# Patient Record
Sex: FEMALE | Race: WHITE | HISPANIC OR LATINO | Employment: UNEMPLOYED | URBAN - METROPOLITAN AREA
[De-identification: names, ages, dates, MRNs, and addresses within clinical notes are randomized per-mention and may not be internally consistent; named-entity substitution may affect disease eponyms.]

---

## 2017-02-16 ENCOUNTER — HOSPITAL ENCOUNTER (OUTPATIENT)
Dept: RADIOLOGY | Facility: HOSPITAL | Age: 52
Discharge: HOME | End: 2017-02-16
Attending: OBSTETRICS & GYNECOLOGY
Payer: MEDICAID

## 2017-02-16 ENCOUNTER — OFFICE VISIT (OUTPATIENT)
Dept: OBSTETRICS AND GYNECOLOGY | Facility: CLINIC | Age: 52
End: 2017-02-16
Payer: MEDICAID

## 2017-02-16 VITALS — SYSTOLIC BLOOD PRESSURE: 128 MMHG | DIASTOLIC BLOOD PRESSURE: 74 MMHG | WEIGHT: 175.94 LBS

## 2017-02-16 DIAGNOSIS — N95.2 POSTMENOPAUSAL ATROPHIC VAGINITIS: ICD-10-CM

## 2017-02-16 DIAGNOSIS — Z12.31 ENCOUNTER FOR SCREENING MAMMOGRAM FOR MALIGNANT NEOPLASM OF BREAST: ICD-10-CM

## 2017-02-16 DIAGNOSIS — Z12.31 ENCOUNTER FOR SCREENING MAMMOGRAM FOR MALIGNANT NEOPLASM OF BREAST: Primary | ICD-10-CM

## 2017-02-16 DIAGNOSIS — E89.40 SURGICAL MENOPAUSE: ICD-10-CM

## 2017-02-16 PROCEDURE — 88141 CYTOPATH C/V INTERPRET: CPT | Mod: ,,, | Performed by: PATHOLOGY

## 2017-02-16 PROCEDURE — 99999 PR PBB SHADOW E&M-NEW PATIENT-LVL III: CPT | Mod: PBBFAC,,, | Performed by: OBSTETRICS & GYNECOLOGY

## 2017-02-16 PROCEDURE — 77067 SCR MAMMO BI INCL CAD: CPT | Mod: 26,,, | Performed by: RADIOLOGY

## 2017-02-16 PROCEDURE — 88175 CYTOPATH C/V AUTO FLUID REDO: CPT | Performed by: PATHOLOGY

## 2017-02-16 PROCEDURE — 99204 OFFICE O/P NEW MOD 45 MIN: CPT | Mod: S$PBB,,, | Performed by: OBSTETRICS & GYNECOLOGY

## 2017-02-16 PROCEDURE — 77067 SCR MAMMO BI INCL CAD: CPT | Mod: TC

## 2017-02-16 PROCEDURE — 99203 OFFICE O/P NEW LOW 30 MIN: CPT | Mod: PBBFAC,PO | Performed by: OBSTETRICS & GYNECOLOGY

## 2017-02-16 PROCEDURE — 77063 BREAST TOMOSYNTHESIS BI: CPT | Mod: 26,,, | Performed by: RADIOLOGY

## 2017-02-16 RX ORDER — LOVASTATIN 20 MG/1
TABLET ORAL
COMMUNITY
Start: 2017-02-07

## 2017-02-16 RX ORDER — INSULIN GLARGINE 100 [IU]/ML
INJECTION, SOLUTION SUBCUTANEOUS
COMMUNITY
Start: 2017-02-07

## 2017-02-16 RX ORDER — ESTRADIOL 0.1 MG/G
1 CREAM VAGINAL
Qty: 42.5 G | Refills: 4 | Status: SHIPPED | OUTPATIENT
Start: 2017-02-16 | End: 2018-02-16

## 2017-02-16 RX ORDER — BENAZEPRIL HYDROCHLORIDE 20 MG/1
TABLET ORAL
COMMUNITY
Start: 2017-02-07

## 2017-02-16 RX ORDER — CITALOPRAM 20 MG/1
TABLET, FILM COATED ORAL
COMMUNITY
Start: 2017-02-07

## 2017-02-16 RX ORDER — METFORMIN HYDROCHLORIDE 1000 MG/1
TABLET ORAL
COMMUNITY
Start: 2017-02-07

## 2017-02-17 NOTE — PROGRESS NOTES
GYNECOLOGY  MC:  Meche Lacy is a 51 y.o.    Here today for   Annual visit   Hx of Hysterectomy 20 years ago Due to Cervical cancer; followed up x  5 years without evidence of recurrence, now in yearly follow up   No gyn symptoms   Used to take premarin x menopausal symptoms but not anymore     Past Medical History   Diagnosis Date    Diabetes mellitus      Past Surgical History   Procedure Laterality Date    Hysterectomy       section      Cholecystectomy       Family History   Problem Relation Age of Onset    Cancer Maternal Grandfather      Social History   Substance Use Topics    Smoking status: Never Smoker    Smokeless tobacco: None    Alcohol use No     OB History    Para Term  AB SAB TAB Ectopic Multiple Living   5 2 2  3     2      # Outcome Date GA Lbr Pramod/2nd Weight Sex Delivery Anes PTL Lv   5 Term 95    M CS-LTranv   Y   4 Term 87    M Vag-Spont   Y   3 AB            2 AB            1 AB                   Visit Vitals    /74    Wt 79.8 kg (175 lb 14.8 oz)       Last PAP= 1 year ago  LMP = n/a  Last Mammogram = 2 years ago  Last Colonoscopy  =n/a      COMPREHENSIVE GYN HISTORY:  G 5 P2      PAP History: Denies abnormal Paps.  Infection History: Denies STDs. Denies PID.  Benign History: Denies uterine fibroids. Denies ovarian cysts. Denies endometriosis.   Cancer History: Per HPI   Sexual Activity History: (  ) Yes   (  )   no   Menstrual History: Age of menarche: (   )  years. Every (   )       days, flows for (   )   days. moderate  flow.  Dysmenorrhea History:  absent  Contraception:  n/a    ROS  GENERAL: Denies significant weight gain or weight loss. Feeling well overall.  SKIN: Denies rash or lesions.  Normal skin turgor  HEAD: Denies head injury or headache.   NODES: Denies enlarged lymph nodes.   CHEST: Denies chest pain or shortness of breath.   CARDIOVASCULAR: Denies palpitations or left sided chest pain.   ABDOMEN: No abdominal  pain,no  diarrhea,constipation  nausea, vomiting or rectal bleeding.   URINARY: normal  Frequency,no  Dysuria or burning on urination.   REPRODUCTIVE: Per HPI   BREASTS: The patient sometimes performs breast self-examination and denies pain, lumps, or nipple discharge.   HEMATOLOGIC: No easy bruisability or excessive bleeding.   MUSCULOSKELETAL: Denies joint pain or swelling.   NEUROLOGIC: Denies syncope or weakness.   PSYCHIATRIC: Denies depression, anxiety or mood swings.    Physical Exam     Constitutional: She is oriented to person, place, and time. She appears well-developed and well-nourished. No distress.   HENT:   Head: Normocephalic.   NECK: Neck symmetric without masses or thyromegaly.  Cardiovascular: Normal rate and regular rhythm.   Pulmonary/Chest: Effort normal and breath sounds normal. No respiratory distress. She has no wheezes.   Breasts: Symmetrical, no skin changes or visible lesions. No palpable masses, nipple discharge or adenopathy bilaterally.  Abdominal: Soft not distended. Bowel sounds are normal. She exhibits   no masses . No tenderness to palpation.   Genitourinary: Pelvic exam was performed with patient supine.   External genitalia normal no lesions.Normal hair distribution   Adequate perineal body,normal urethral meatus   Vagina pale, thin with signs of atrophy , no vaginal  Discharge.   Cervix absent    Adnexa without masses or tenderness. Urethra and bladder normal  Extremities normal no cyanosis ,edema.     Procedures:  Mammogram  PAP of vaginal cuff       A/P Meche Lacy 51 y.o.     Dx=  1- ROutine gyn exam  2- Hx hysterectomy   3- Hx cervical cancer   4-Postmenopausal vaginal atrophy     Plan:  Routine gyn   -s/p normal breast exam   -s/p normal pelvic exam:  PAtienr desires to start using estrogen cream for vaginal dryness and atrophy   Rx for estrace 1 gram bi-weekly   Will follow PAP and mammogram    Patient was counseled today on A.C.S. Pap guidelines and  recommendations for yearly pelvic exams, mammograms and monthly self breast exams; to see her PCP for other health maintenance, nutrition and weight gain counseling, discussed lab values.  Discussed colonoscopy recommendations every 10 years starting at age 50   Calcium and vit D daily intake     F/u in 1 yr or EMI Gunter M.D.   OB/GYN

## 2017-02-22 ENCOUNTER — TELEPHONE (OUTPATIENT)
Dept: OBSTETRICS AND GYNECOLOGY | Facility: CLINIC | Age: 52
End: 2017-02-22

## 2017-02-23 NOTE — TELEPHONE ENCOUNTER
----- Message from Yani Calvo sent at 2/23/2017  2:21 PM CST -----  Contact: self, [phone]  Patient called in returning your call. Please advise.

## 2017-02-23 NOTE — TELEPHONE ENCOUNTER
Called and informed patient of results (pap unsatisfactory) appointment made. Patient agreed and verbalized understanding.

## 2017-02-23 NOTE — TELEPHONE ENCOUNTER
PAP unsatisfactory  Need Re-PAP in 4 weeks  When is not bleeding      Joseph Gunter M.D.   OB/GYN    2/22/2017

## 2017-03-21 ENCOUNTER — OFFICE VISIT (OUTPATIENT)
Dept: OBSTETRICS AND GYNECOLOGY | Facility: CLINIC | Age: 52
End: 2017-03-21
Payer: MEDICAID

## 2017-03-21 VITALS — WEIGHT: 177.69 LBS | SYSTOLIC BLOOD PRESSURE: 112 MMHG | DIASTOLIC BLOOD PRESSURE: 74 MMHG

## 2017-03-21 DIAGNOSIS — R87.615 ENCOUNTER FOR REPEAT PAP SMEAR DUE TO PREVIOUS INSUFFICIENT CERVICAL CELLS: ICD-10-CM

## 2017-03-21 DIAGNOSIS — N95.2 POSTMENOPAUSAL ATROPHIC VAGINITIS: ICD-10-CM

## 2017-03-21 DIAGNOSIS — N76.0 VULVOVAGINITIS: Primary | ICD-10-CM

## 2017-03-21 PROCEDURE — 99999 PR PBB SHADOW E&M-EST. PATIENT-LVL III: CPT | Mod: PBBFAC,,, | Performed by: OBSTETRICS & GYNECOLOGY

## 2017-03-21 PROCEDURE — 99213 OFFICE O/P EST LOW 20 MIN: CPT | Mod: PBBFAC,PO | Performed by: OBSTETRICS & GYNECOLOGY

## 2017-03-21 PROCEDURE — 99213 OFFICE O/P EST LOW 20 MIN: CPT | Mod: S$PBB,,, | Performed by: OBSTETRICS & GYNECOLOGY

## 2017-03-21 PROCEDURE — 88141 CYTOPATH C/V INTERPRET: CPT | Mod: ,,, | Performed by: PATHOLOGY

## 2017-03-21 PROCEDURE — 88142 CYTOPATH C/V THIN LAYER: CPT | Performed by: PATHOLOGY

## 2017-03-21 RX ORDER — CLOTRIMAZOLE AND BETAMETHASONE DIPROPIONATE 10; .64 MG/G; MG/G
CREAM TOPICAL 2 TIMES DAILY
Qty: 15 G | Refills: 1 | Status: SHIPPED | OUTPATIENT
Start: 2017-03-21

## 2017-03-21 NOTE — PROGRESS NOTES
GYNECOLOGY  MC:  Meche Lacy is a 51 y.o.    Here today re-pap for insufficient  Sample       Hx of Hysterectomy 20 years ago Due to Cervical cancer; followed up x  5 years without evidence of recurrence, now in yearly follow up   No gyn symptoms   Used to take premarin x menopausal symptoms but not anymore     Past Medical History:   Diagnosis Date    Diabetes mellitus      Past Surgical History:   Procedure Laterality Date     SECTION      CHOLECYSTECTOMY      HYSTERECTOMY       Family History   Problem Relation Age of Onset    Cancer Maternal Grandfather      Social History   Substance Use Topics    Smoking status: Never Smoker    Smokeless tobacco: None    Alcohol use No     OB History    Para Term  AB SAB TAB Ectopic Multiple Living   5 2 2  3     2      # Outcome Date GA Lbr Pramod/2nd Weight Sex Delivery Anes PTL Lv   5 Term 95    M CS-LTranv   Y   4 Term 87    M Vag-Spont   Y   3 AB            2 AB            1 AB                   /74  Wt 80.6 kg (177 lb 11.1 oz)    Last PAP= 1 year ago  LMP = n/a  Last Mammogram = 2 years ago  Last Colonoscopy  =n/a      COMPREHENSIVE GYN HISTORY:  G 5 P2      PAP History: Denies abnormal Paps.  Infection History: Denies STDs. Denies PID.  Benign History: Denies uterine fibroids. Denies ovarian cysts. Denies endometriosis.   Cancer History: Per HPI   Sexual Activity History: (  ) Yes   (  )   no   Menstrual History: Age of menarche: (   )  years. Every (   )       days, flows for (   )   days. moderate  flow.  Dysmenorrhea History:  absent  Contraception:  n/a    ROS  GENERAL: Denies significant weight gain or weight loss. Feeling well overall.  SKIN: Denies rash or lesions.  Normal skin turgor  HEAD: Denies head injury or headache.   NODES: Denies enlarged lymph nodes.   CHEST: Denies chest pain or shortness of breath.   CARDIOVASCULAR: Denies palpitations or left sided chest pain.   ABDOMEN: No abdominal pain,no   diarrhea,constipation  nausea, vomiting or rectal bleeding.   URINARY: normal  Frequency,no  Dysuria or burning on urination.   REPRODUCTIVE: Per HPI   BREASTS: The patient sometimes performs breast self-examination and denies pain, lumps, or nipple discharge.   HEMATOLOGIC: No easy bruisability or excessive bleeding.   MUSCULOSKELETAL: Denies joint pain or swelling.   NEUROLOGIC: Denies syncope or weakness.   PSYCHIATRIC: Denies depression, anxiety or mood swings.    Physical Exam     Constitutional: She is oriented to person, place, and time. She appears well-developed and well-nourished. No distress.   HENT:   Head: Normocephalic.   NECK: Neck symmetric without masses or thyromegaly.  Cardiovascular: Normal rate and regular rhythm.   Pulmonary/Chest: Effort normal and breath sounds normal. No respiratory distress. She has no wheezes.   Breasts: Symmetrical, no skin changes or visible lesions. No palpable masses, nipple discharge or adenopathy bilaterally.  Abdominal: Soft not distended. Bowel sounds are normal. She exhibits   no masses . No tenderness to palpation.   Genitourinary: Pelvic exam was performed with patient supine.   External genitalia normal no lesions.Normal hair distribution   Adequate perineal body,normal urethral meatus   Vagina pale, thin with signs of atrophy , no vaginal  Discharge.   Cervix absent    Adnexa without masses or tenderness. Urethra and bladder normal  Extremities normal no cyanosis ,edema.     Procedures:    PAP of vaginal cuff       A/P Meche Roberto Lacy 51 y.o.     Dx=  1- ROutine gyn exam  2- Hx hysterectomy   3- Hx cervical cancer   4-Postmenopausal vaginal atrophy     Plan:  Routine gyn   -s/p normal breast exam   -s/p normal pelvic exam:  PAtienr desires to start using estrogen cream for vaginal dryness and atrophy   Rx for estrace 1 gram bi-weekly   Will follow PAP and mammogram  Mycolog to apply BID  And stop using Vinegar vaginal douches     Patient was counseled  today on A.C.S. Pap guidelines and recommendations for yearly pelvic exams, mammograms and monthly self breast exams; to see her PCP for other health maintenance, nutrition and weight gain counseling, discussed lab values.  Discussed colonoscopy recommendations every 10 years starting at age 50   Calcium and vit D daily intake     F/u in 1 yr or AYANN    Joseph Gunter M.D.   OB/GYN